# Patient Record
Sex: FEMALE | URBAN - NONMETROPOLITAN AREA
[De-identification: names, ages, dates, MRNs, and addresses within clinical notes are randomized per-mention and may not be internally consistent; named-entity substitution may affect disease eponyms.]

---

## 2019-09-19 ENCOUNTER — NURSE TRIAGE (OUTPATIENT)
Dept: CALL CENTER | Facility: HOSPITAL | Age: 29
End: 2019-09-19

## 2019-09-19 NOTE — TELEPHONE ENCOUNTER
Caller states that she woke up yesterday and had a bite of some type on her left thigh.  The area was the size of an egg a few hours later.  Caller states that now the area is very red, hot, and the size of a bar of soap and is visible underneath her jeans.  She states that it looks like there are 3 areas with black centers.    Reason for Disposition  • [1] Red or very tender (to touch) area AND [2] started over 24 hours after the bite    Additional Information  • Negative: [1] Life-threatening reaction (anaphylaxis) in the past to same insect bite AND [2] < 2 hours since bite  • Negative: Passed out (i.e., lost consciousness, collapsed and was not responding)  • Negative: Difficulty breathing or wheezing  • Negative: [1] Hoarseness or cough AND [2] sudden onset following bite  • Negative: [1] Difficulty swallowing or slurred speech AND [2] sudden onset following bite  • Negative: Sounds like a life-threatening emergency to the triager  • Negative: Bee sting(s)  • Negative: Tick bite(s)  • Negative: Mosquito bite(s)  • Negative: Bed bug bite(s)  • Negative: Boil suspected (i.e., painful red lump and NO insect bite)  • Negative: Doesn't sound like an insect bite  • Negative: Patient sounds very sick or weak to the triager  • Negative: [1] SEVERE bite pain AND [2] not improved after 2 hours of pain medicine  • Negative: [1] Fever AND [2] red area  • Spider bite(s)  • Negative: Difficulty breathing or swallowing  • Negative: Shock suspected (e.g., cold/pale/clammy skin, too weak to stand, low BP, rapid pulse)  • Negative: Difficult to awaken or acting confused (e.g., disoriented, slurred speech)  • Negative: Sounds like a life-threatening emergency to the triager  • Negative: Boil suspected (i.e., painful red lump and NO spider bite)  • Negative: Not a spider bite  • Negative: [1] Black  (or brown ) spider bite AND [2] local skin changes  • Negative: Abdominal pain, chest tightness or other muscle cramps  •  "Negative: Urine is brown, black or red in color  • Negative: Vomiting  • Negative: [1] Rash elsewhere on body AND [2] developed after spider bite  • Negative: Patient sounds very sick or weak to the triager  • Negative: [1] SEVERE bite pain AND [2] not improved after 2 hours of pain medicine  • Negative: [1] Fever AND [2] red area  • Negative: [1] Fever AND [2] area is very tender to touch  • Negative: [1] Red streak or red line AND [2] length > 2 inches (5 cm)    Answer Assessment - Initial Assessment Questions  1. TYPE of INSECT: \"What type of insect was it?\"       Possibly a spider  2. ONSET: \"When did you get bitten?\"       yesterday  3. LOCATION: \"Where is the insect bite located?\"       Left thigh  4. REDNESS: \"Is the area red or pink?\" If so, ask \"What size is area of redness?\" (inches or cm). \"When did the redness start?\"      Yes about 3 inches  5. PAIN: \"Is there any pain?\" If so, ask: \"How bad is it?\"  (Scale 1-10; or mild, moderate, severe)      Yes moderate  6. ITCHING: \"Does it itch?\" If so, ask: \"How bad is the itch?\"     - MILD: doesn't interfere with normal activities    - MODERATE-SEVERE: interferes with work, school, sleep, or other activities       no  7. SWELLING: \"How big is the swelling?\" (inches, cm, or compare to coins)      About 3 inches  8. OTHER SYMPTOMS: \"Do you have any other symptoms?\"  (e.g., difficulty breathing, hives)      no  9. PREGNANCY: \"Is there any chance you are pregnant?\" \"When was your last menstrual period?\"      no    Answer Assessment - Initial Assessment Questions  1. TYPE of SPIDER: \"What type of spider was it?\"  (e.g., name, unknown, or brief description)      unknown  2. LOCATION: \"Where is the bite located?\"        Left thigh  3. PAIN: \"Is there any pain?\" If so, ask: \"How bad is it?\"  (Scale 1-10; or mild, moderate, severe)      moderate  4. SWELLING: \"How big is the swelling?\" (Inches, cm or compare to coins)       About 3 inches  5. ONSET: \"When did the bite " "occur?\" (Minutes or hours ago)       yesterday  6. TETANUS: \"When was the last tetanus booster?\"       Not sure  7. OTHER SYMPTOMS: \"Do you have any other symptoms?\"  (e.g., muscle cramps, abdominal pain, change in urine color)      Hot and red    Protocols used: SPIDER BITE - [facility]-ADULT-, INSECT BITE-ADULT-      "